# Patient Record
Sex: FEMALE | Race: BLACK OR AFRICAN AMERICAN | NOT HISPANIC OR LATINO
[De-identification: names, ages, dates, MRNs, and addresses within clinical notes are randomized per-mention and may not be internally consistent; named-entity substitution may affect disease eponyms.]

---

## 2021-03-05 ENCOUNTER — APPOINTMENT (OUTPATIENT)
Dept: OBGYN | Facility: CLINIC | Age: 21
End: 2021-03-05
Payer: COMMERCIAL

## 2021-03-05 PROCEDURE — 99385 PREV VISIT NEW AGE 18-39: CPT

## 2021-03-10 PROBLEM — Z00.00 ENCOUNTER FOR PREVENTIVE HEALTH EXAMINATION: Status: ACTIVE | Noted: 2021-03-10

## 2021-03-12 ENCOUNTER — APPOINTMENT (OUTPATIENT)
Dept: OBGYN | Facility: CLINIC | Age: 21
End: 2021-03-12

## 2021-03-12 ENCOUNTER — APPOINTMENT (OUTPATIENT)
Dept: ANTEPARTUM | Facility: CLINIC | Age: 21
End: 2021-03-12

## 2022-01-11 ENCOUNTER — APPOINTMENT (OUTPATIENT)
Dept: OBGYN | Facility: CLINIC | Age: 22
End: 2022-01-11
Payer: COMMERCIAL

## 2022-01-11 VITALS — WEIGHT: 198 LBS | SYSTOLIC BLOOD PRESSURE: 130 MMHG | DIASTOLIC BLOOD PRESSURE: 80 MMHG

## 2022-01-11 DIAGNOSIS — Z87.42 PERSONAL HISTORY OF OTHER DISEASES OF THE FEMALE GENITAL TRACT: ICD-10-CM

## 2022-01-11 DIAGNOSIS — Z83.3 FAMILY HISTORY OF DIABETES MELLITUS: ICD-10-CM

## 2022-01-11 DIAGNOSIS — R10.2 PELVIC AND PERINEAL PAIN: ICD-10-CM

## 2022-01-11 DIAGNOSIS — Z82.49 FAMILY HISTORY OF ISCHEMIC HEART DISEASE AND OTHER DISEASES OF THE CIRCULATORY SYSTEM: ICD-10-CM

## 2022-01-11 PROCEDURE — 99214 OFFICE O/P EST MOD 30 MIN: CPT

## 2022-01-11 NOTE — REVIEW OF SYSTEMS
[Recent Weight Gain (___ Lbs)] : recent [unfilled] ~Ulb weight gain [Abn Vaginal bleeding] : abnormal vaginal bleeding [Pelvic pain] : pelvic pain [Back Pain] : back pain [Negative] : Heme/Lymph

## 2022-01-11 NOTE — HISTORY OF PRESENT ILLNESS
[FreeTextEntry1] : 20 y/o P0 presenting for follow up. Patient last seen March 2021 for initial evaluation but did not follow up for pelvic sonogram. Patient states that last month her menses was 20 days late, she took UPT at urgent care which was negative. Endorses weight gain, bloating, back pain and intermittent pelvic pain.  [PapSmeardate] : 3/5/2021 [TextBox_31] : ANGELA

## 2022-01-11 NOTE — DISCUSSION/SUMMARY
[FreeTextEntry1] : 22 y/o F presenting with irregular menses last month and intermittent pelvic pain\par -Pelvic sonogram today wnl\par -irregular menses x1 month: possible chemical pregnancy vs stress induced amenorrhea - advised diet and exercise\par -Patient to monitor menses x3 months, if irregularities persist, f/u for blood work\par -Contraception: declines - patient okay with pregnancy\par -f/u PRN

## 2022-06-27 ENCOUNTER — NON-APPOINTMENT (OUTPATIENT)
Age: 22
End: 2022-06-27

## 2022-07-11 ENCOUNTER — APPOINTMENT (OUTPATIENT)
Dept: OBGYN | Facility: CLINIC | Age: 22
End: 2022-07-11

## 2023-01-13 ENCOUNTER — APPOINTMENT (OUTPATIENT)
Dept: OBGYN | Facility: CLINIC | Age: 23
End: 2023-01-13

## 2023-04-28 ENCOUNTER — APPOINTMENT (OUTPATIENT)
Dept: OBGYN | Facility: CLINIC | Age: 23
End: 2023-04-28

## 2023-08-06 ENCOUNTER — EMERGENCY (EMERGENCY)
Facility: HOSPITAL | Age: 23
LOS: 1 days | Discharge: HOME | End: 2023-08-06
Attending: EMERGENCY MEDICINE | Admitting: EMERGENCY MEDICINE
Payer: COMMERCIAL

## 2023-08-06 PROCEDURE — 99284 EMERGENCY DEPT VISIT MOD MDM: CPT

## 2023-08-07 VITALS
SYSTOLIC BLOOD PRESSURE: 132 MMHG | RESPIRATION RATE: 16 BRPM | HEART RATE: 70 BPM | OXYGEN SATURATION: 100 % | DIASTOLIC BLOOD PRESSURE: 90 MMHG | TEMPERATURE: 98 F

## 2023-08-07 PROCEDURE — 93010 ELECTROCARDIOGRAM REPORT: CPT

## 2023-08-07 PROCEDURE — 71046 X-RAY EXAM CHEST 2 VIEWS: CPT | Mod: 26

## 2023-08-07 RX ORDER — FAMOTIDINE 10 MG/ML
20 INJECTION INTRAVENOUS DAILY
Refills: 0 | Status: DISCONTINUED | OUTPATIENT
Start: 2023-08-07 | End: 2023-08-10

## 2023-08-07 RX ORDER — SUCRALFATE 1 G
1 TABLET ORAL ONCE
Refills: 0 | Status: COMPLETED | OUTPATIENT
Start: 2023-08-07 | End: 2023-08-07

## 2023-08-07 RX ADMIN — Medication 1 GRAM(S): at 01:23

## 2023-08-07 RX ADMIN — FAMOTIDINE 20 MILLIGRAM(S): 10 INJECTION INTRAVENOUS at 01:23

## 2023-08-07 NOTE — ED PROVIDER NOTE - PATIENT PORTAL LINK FT
You can access the FollowMyHealth Patient Portal offered by [facility] by registering at the following website: [email or web address]. By joining [facility]’s FollowMyHealth portal, you will also be able to view your health information using other applications (apps) compatible with our system.

## 2023-08-07 NOTE — ED ADULT NURSE NOTE - OBJECTIVE STATEMENT
Patient is A&OX4, awake, alert. Pt coming to ED from home regarding chest pressure that occurred today. Pt endorses two episodes of chest pressure today, second episode started around 9 PM and was worse causing her to come to ED for further evaluation. Pt states chest pressure is intermitted, midsternal, non radiating, worsen with laying down . Pt states chest pressure causes her to not be able to take a deep breathe, she states it feels like there a "blockage" when she takes deep breathes. Pt denies any fever, chills, weakness, blurred vision.  h/o GERD. will continue to monitor,.

## 2023-08-07 NOTE — ED PROVIDER NOTE - CLINICAL SUMMARY MEDICAL DECISION MAKING FREE TEXT BOX
Criss York DO PGY-3  22-year-old female history of GERD, childhood asthma, presents to the ED with episode of chest pressure and burning-like sensation that occurred tonight while lying down.  Patient reports pain is positional worse with lying down.  States it feels similar to previous GERD, so she took famotidine which did not provide much relief so she came to the ER. The chest pain is not pleuritic, nonexertional. EKG NSR without TWI/SHANNON. Hemodynamically stable, afebrile. Differential includes but not limited to GERD, pneumonia/ptx, chemical pneumonitis. Will treat with carafate, obtain CXR, and re-assess for dispo.

## 2023-08-07 NOTE — ED PROVIDER NOTE - OBJECTIVE STATEMENT
22-year-old female history of GERD, childhood asthma, presents to the ED with episode of chest pressure and burning-like sensation that occurred tonight while lying down.  Patient reports pain is positional worse with lying down.  It is not pleuritic, nonexertional.  No associated shortness of breath, fevers, chills, diaphoresis, no URI symptoms, vomiting, diarrhea or dysuria.  Tried taking famotidine for symptoms which usually helps but did not provide relief today so she came to the ER. 22-year-old female history of GERD, childhood asthma, presents to the ED with episode of chest pressure and burning-like sensation that occurred tonight while lying down.  Patient reports pain is positional worse with lying down.  States it feels similar to previous GERD, so she took famotidine which did not provide much relief so she came to the ER. The chest pain is not pleuritic, nonexertional.  No associated shortness of breath, fevers, chills, diaphoresis, no URI symptoms, vomiting, diarrhea or dysuria. No history of clots, recent travel, OCP use, or cigarette use. Pt smoked hookah 2 days ago.

## 2023-08-07 NOTE — ED PROVIDER NOTE - ATTENDING CONTRIBUTION TO CARE
DR. VITAL, ATTENDING MD-  I performed a face to face bedside interview with the patient regarding history of present illness, review of symptoms and past medical history. I completed an independent physical exam.  I have discussed the patient's plan of care with the resident.   Documentation as above in the note.    22-year-old female history of GERD presenting with burning substernal chest pain for 1 day.  She has had several months of the symptoms but worsened today.  No PE risk factors.  No history of early cardiac disease in her family.  No tobacco or cocaine use.  Likely reflux symptoms.  Will obtain EKG chest x-ray give antacids discharge with GI referral for outpatient work-up.

## 2023-08-07 NOTE — ED ADULT TRIAGE NOTE - CHIEF COMPLAINT QUOTE
Pt c/o chest heaviness x1 day. Hx GERD. Pt states " it feels different, not like GERD, I cant take a full deep breath. Pt denies lightheadedness, dizziness, n/v. Pt well appearing.

## 2023-08-07 NOTE — ED PROVIDER NOTE - PROGRESS NOTE DETAILS
Criss York DO PGY-3  Patient re-assessed. Vitals stable. Pain has improved after interventions. Test results explained to patient.    Discussed the plan for discharge home with the patient. Advised return precautions for any alarming or worsening symptoms, or any other concerns. Recommended that the patient call their primary care doctor today, inform them of their visit to the ER, and to obtain repeat evaluation from their PCP in the next 1-3 days. Patient expresses understanding and agrees with the plan for follow up. At the time of discharge the patient remained stable, in no acute distress.

## 2023-08-07 NOTE — ED ADULT NURSE REASSESSMENT NOTE - NS ED NURSE REASSESS COMMENT FT1
Report given by Marisel RN, pt A&O x4 ambulatory. Pt waiting for xray results. Pt denies any chest pain at this time or any discomfort. Pt safety maintained.

## 2023-08-07 NOTE — ED PROVIDER NOTE - NSFOLLOWUPINSTRUCTIONS_ED_ALL_ED_FT
You were seen in the ER today for chest pressure/burning.    You were given medication, had an x-ray and EKG performed which were normal.    For your symptoms you can take 20 mg famotidine 2 times daily.    Please follow up with your primary care doctor within 1 - 3 days. Call and let them know you were seen in the ER today.   Bring the results of your imaging with you to your appointment, if applicable.  You can also follow up with a gastroenterologist. We have provided a number in the paper work so you can call and make an appointment.    Return to the ER for any worsening symptoms or concerns, including chest pain, shortness of breath, lightheadedness, weakness, or any other concerns.

## 2023-08-07 NOTE — ED PROVIDER NOTE - NSFOLLOWUPCLINICS_GEN_ALL_ED_FT
[facility]  Gastroenterology  [address] [place] [id number]  Phone: [phone]  Fax:   Follow Up Time: 7-10 Days

## 2023-08-15 ENCOUNTER — APPOINTMENT (OUTPATIENT)
Dept: GASTROENTEROLOGY | Facility: CLINIC | Age: 23
End: 2023-08-15
Payer: COMMERCIAL

## 2023-08-15 ENCOUNTER — NON-APPOINTMENT (OUTPATIENT)
Age: 23
End: 2023-08-15

## 2023-08-15 VITALS
OXYGEN SATURATION: 98 % | SYSTOLIC BLOOD PRESSURE: 123 MMHG | WEIGHT: 198 LBS | DIASTOLIC BLOOD PRESSURE: 79 MMHG | HEART RATE: 83 BPM | TEMPERATURE: 97.2 F | BODY MASS INDEX: 34.65 KG/M2 | HEIGHT: 63.5 IN

## 2023-08-15 DIAGNOSIS — K21.9 GASTRO-ESOPHAGEAL REFLUX DISEASE W/OUT ESOPHAGITIS: ICD-10-CM

## 2023-08-15 PROCEDURE — 99204 OFFICE O/P NEW MOD 45 MIN: CPT

## 2023-08-15 NOTE — ASSESSMENT
[FreeTextEntry1] : Impression: 1. Chest heaviness - suspect acid reflux without alarm symptoms; unlikely EoE or dysmotility  Plan: -Discussed pathophysiology of reflux -Given she has no alarm symptoms and relatively rare symptoms, advised lifestyle modifications for reflux. Asked patient to avoid eating 3 hours before going to bed or laying down, and to elevate the head of his bed. -Can continue PRN Tums, or Pepcid PRN fo rnow -If symptoms worsen, will give PPI trial -Discussed that per guidelines, would consider PPI trial first before invasive testing, as she has no dysphagia/other alarm symptoms, and there are always risks with invasive testing which she does not need at this time

## 2023-08-15 NOTE — PHYSICAL EXAM
[Alert] : alert [Normal Voice/Communication] : normal voice/communication [Healthy Appearing] : healthy appearing [No Acute Distress] : no acute distress [Sclera] : the sclera and conjunctiva were normal [Hearing Threshold Finger Rub Not Elbert] : hearing was normal [Normal Appearance] : the appearance of the neck was normal [No Respiratory Distress] : no respiratory distress [No Acc Muscle Use] : no accessory muscle use [Respiration, Rhythm And Depth] : normal respiratory rhythm and effort [Auscultation Breath Sounds / Voice Sounds] : lungs were clear to auscultation bilaterally [Heart Rate And Rhythm] : heart rate was normal and rhythm regular [Normal S1, S2] : normal S1 and S2 [Bowel Sounds] : normal bowel sounds [Abdomen Tenderness] : non-tender [No Masses] : no abdominal mass palpated [Abdomen Soft] : soft [No CVA Tenderness] : no CVA  tenderness [Abnormal Walk] : normal gait [Normal Color / Pigmentation] : normal skin color and pigmentation [No Focal Deficits] : no focal deficits [Oriented To Time, Place, And Person] : oriented to person, place, and time

## 2023-08-15 NOTE — HISTORY OF PRESENT ILLNESS
[FreeTextEntry1] : This is a 22 year old female with no significant medical problems, who presents for evaluation of heavy sensation in chest, reflux.  She has had reflux, characterized by a heaviness sin her chest. She has no nausea/vomiting/regurgitation. The sensation usually last ~30 minutes. She has had it intermittently for years.  It occurs more at night when she lays down. No dysphagia. She goes to sleep around 12midnight-1AM, and eats around 8PM. She does not snack after dinner. She went to the ER as her symptoms was more frequent last week, was given famotidine which partially helped and cardiac workup was negative. She started drinking Kabucha last week to help with weight loss, as she has also gained some weight recently. She has never been on PPI before. She takes aspirin sometimes for headaches, but only intermittently. No other NSAIDs.  No chronic diarrhea, no melena/hematochezia. She denies any dysphagia. She only takes Tums usually once a week at most. No prior endoscopic evaluation before. No abnormal weight loss.   Typical day's diet consists of: Late morning: Turkey sandwich with lettuce/tomatoes; Honey elham tea Dinner: Chicken salad, Greek yogurt

## 2023-10-03 ENCOUNTER — APPOINTMENT (OUTPATIENT)
Dept: ANTEPARTUM | Facility: CLINIC | Age: 23
End: 2023-10-03
Payer: COMMERCIAL

## 2023-10-03 ENCOUNTER — APPOINTMENT (OUTPATIENT)
Dept: OBGYN | Facility: CLINIC | Age: 23
End: 2023-10-03
Payer: COMMERCIAL

## 2023-10-03 ENCOUNTER — ASOB RESULT (OUTPATIENT)
Age: 23
End: 2023-10-03

## 2023-10-03 VITALS
HEIGHT: 63.5 IN | DIASTOLIC BLOOD PRESSURE: 82 MMHG | WEIGHT: 202 LBS | SYSTOLIC BLOOD PRESSURE: 138 MMHG | BODY MASS INDEX: 35.35 KG/M2

## 2023-10-03 DIAGNOSIS — Z01.419 ENCOUNTER FOR GYNECOLOGICAL EXAMINATION (GENERAL) (ROUTINE) W/OUT ABNORMAL FINDINGS: ICD-10-CM

## 2023-10-03 DIAGNOSIS — N89.8 OTHER SPECIFIED NONINFLAMMATORY DISORDERS OF VAGINA: ICD-10-CM

## 2023-10-03 PROCEDURE — 99395 PREV VISIT EST AGE 18-39: CPT

## 2023-10-03 PROCEDURE — 76830 TRANSVAGINAL US NON-OB: CPT | Mod: 59

## 2023-10-03 PROCEDURE — 99213 OFFICE O/P EST LOW 20 MIN: CPT | Mod: 25

## 2023-10-03 PROCEDURE — 76857 US EXAM PELVIC LIMITED: CPT

## 2023-10-03 RX ORDER — CLOTRIMAZOLE AND BETAMETHASONE DIPROPIONATE 10; .5 MG/G; MG/G
1-0.05 CREAM TOPICAL TWICE DAILY
Qty: 1 | Refills: 1 | Status: ACTIVE | COMMUNITY
Start: 2023-10-03 | End: 1900-01-01

## 2023-10-05 ENCOUNTER — NON-APPOINTMENT (OUTPATIENT)
Age: 23
End: 2023-10-05

## 2023-10-05 LAB
C TRACH RRNA SPEC QL NAA+PROBE: NOT DETECTED
N GONORRHOEA RRNA SPEC QL NAA+PROBE: NOT DETECTED
SOURCE AMPLIFICATION: NORMAL

## 2023-10-05 RX ORDER — METRONIDAZOLE 500 MG/1
500 TABLET ORAL TWICE DAILY
Qty: 14 | Refills: 0 | Status: ACTIVE | COMMUNITY
Start: 2023-10-05 | End: 1900-01-01

## 2023-10-06 LAB
CANDIDA VAG CYTO: NOT DETECTED
G VAGINALIS+PREV SP MTYP VAG QL MICRO: DETECTED
T VAGINALIS VAG QL WET PREP: NOT DETECTED

## 2023-10-09 LAB — CYTOLOGY CVX/VAG DOC THIN PREP: NORMAL

## 2025-05-07 ENCOUNTER — APPOINTMENT (OUTPATIENT)
Dept: OBGYN | Facility: CLINIC | Age: 25
End: 2025-05-07